# Patient Record
(demographics unavailable — no encounter records)

---

## 2025-04-11 NOTE — HISTORY OF PRESENT ILLNESS
[de-identified] : 04/11/2025: Patient presents today for a new injury visit for the L shoulder. Patient states she stated getting pain 6 months ago. Does not recall any specific injury. No treatment to date.

## 2025-04-11 NOTE — IMAGING
[de-identified] : Shoulder Exam Inspection: No swelling, no ecchymosis, no darien deformity Palpation: Tenderness to palpation over greater tuberosity Stability: No instability or tenderness over AC joint Range of Motion:  Passive FF: 160; ER: 70: IR: 20; ER at the side 30 Strength: 5/5 supraspinatus, infraspinatus and subscapularis; there is pain with strength testing Special testing: Positive Seay test, positive impingement sign; Speeds and yergason negative Neuro: Motor and sensory intact distally [Left] : left shoulder [There are no fractures, subluxations or dislocations. No significant abnormalities are seen] : There are no fractures, subluxations or dislocations. No significant abnormalities are seen

## 2025-04-11 NOTE — ASSESSMENT
[FreeTextEntry1] : Atraumatic left shoulder pain for the last 6 months.  This is gotten progressively worse over the last 2 weeks.  Now reports night symptoms and pain with reaching.  Difficulty reaching behind her back.  We reviewed the diagnosis of frozen shoulder in detail.  Although expect good outcome with conservative measures the prognosis is uncertain.  Some cases do require manipulation under anesthesia and ongoing treatments.  Course of physical therapy for posterior capsule stretching is advised.  Home exercise program is also advised.  She will be started on meloxicam 15 mg prescription for maintenance therapy as needed.  Side effects reviewed.  Follow-up in 6 weeks to reassess.  The patient's current medication management of their orthopedic diagnosis was reviewed today: There is a moderate risk of morbidity with further treatment, especially from use of prescription strength medications and possible side effects of these medications which include upset stomach with oral medications, skin reactions to topical medications and cardiac/renal/diabetes issues with long term use.s